# Patient Record
Sex: MALE | Race: WHITE
[De-identification: names, ages, dates, MRNs, and addresses within clinical notes are randomized per-mention and may not be internally consistent; named-entity substitution may affect disease eponyms.]

---

## 2018-09-14 ENCOUNTER — HOSPITAL ENCOUNTER (EMERGENCY)
Dept: HOSPITAL 14 - H.ER | Age: 33
Discharge: HOME | End: 2018-09-14
Payer: COMMERCIAL

## 2018-09-14 VITALS — TEMPERATURE: 98.2 F | OXYGEN SATURATION: 99 %

## 2018-09-14 VITALS — BODY MASS INDEX: 33.6 KG/M2

## 2018-09-14 VITALS — HEART RATE: 90 BPM | DIASTOLIC BLOOD PRESSURE: 92 MMHG | RESPIRATION RATE: 18 BRPM | SYSTOLIC BLOOD PRESSURE: 150 MMHG

## 2018-09-14 DIAGNOSIS — L02.211: Primary | ICD-10-CM

## 2018-09-14 NOTE — ED PDOC
HPI: Wound Care





- HPI


Time Seen by Provider: 09/14/18 11:37


Chief Complaint (Nursing): Wound Check


Chief Complaint (Provider): Wound Check


History Per: Patient


Exam Limitations: no limitations


Location Of Injury: Left: Abdomen (abdominal wall)


Additional Complaint(s): 


33 years old male with no significant medical history presents to the ED for 

evaluation of infection to left lower abdominal wall that started initially as 

an ingrown hair. Patient reports redness, swelling and pain developed and he 

went to urgent care on Monday, where he was started on antibiotics. He states a 

blister formed later on top of redness and drained fluid every day. Patient 

reports fluid drainage was small and he is here because he is not sure if 

symptoms are improving. He denies any chills, fever, nausea, vomiting and 

abdominal pain.





PMD: non provided





Past Medical History


Reviewed: Historical Data, Nursing Documentation, Vital Signs


Vital Signs: 





 Last Vital Signs











Temp  98.2 F   09/14/18 11:24


 


Pulse  93 H  09/14/18 11:24


 


Resp  22   09/14/18 11:24


 


BP  158/99 H  09/14/18 11:24


 


Pulse Ox  99   09/14/18 11:24














- Medical History


PMH: No Chronic Diseases





- Surgical History


Surgical History: No Surg Hx





- Family History


Family History: States: Unknown Family Hx





- Social History


Current smoker - smoking cessation education provided: No


Alcohol: None


Drugs: Denies





- Allergies


Allergies/Adverse Reactions: 


 Allergies











Allergy/AdvReac Type Severity Reaction Status Date / Time


 


No Known Allergies Allergy   Unverified 08/14/13 09:10














Review of Systems


ROS Statement: Except As Marked, All Systems Reviewed And Found Negative


Constitutional: Negative for: Fever, Chills


Gastrointestinal: Positive for: Other (Left lower abdominal wall infection).  

Negative for: Nausea, Vomiting, Abdominal Pain





Physical Exam





- Reviewed


Nursing Documentation Reviewed: Yes


Vital Signs Reviewed: Yes





- Physical Exam


Appears: Positive for: Non-toxic, No Acute Distress


Head Exam: Positive for: ATRAUMATIC, NORMOCEPHALIC


Gastrointestinal/Abdominal: Positive for: Other (Area of induration with mild 

edema near the center. Loss of skin with small opening and little fluctance 

near induration. No discharge. )


Neurologic/Psych: Positive for: Alert, Oriented (x3)





- ECG


O2 Sat by Pulse Oximetry: 99 (RA)


Pulse Ox Interpretation: Normal





Procedure: Wound Repair





- Time Performed


Time Performed: 12:15





- Procedure


Procedure: Wound Repair: I&D





- Anesthetic Technique


Local/Regional Anesthetic:: Lidocaine 1% w/epi





Medical Decision Making


Medical Decision Making: 


Time: 1147





Initial Impression: Cellulitits, possible abscess





Initial Plan:


--Lidocaine/Epi 1% 10 ml IJ


--I & D





1230


Patient was numbed with Lidocaine/Epi 1%. Bloody fluid and purulent drained 

after abscess I&D. Wound was packed with 1 inch stripes. Patient is instructed 

to follow up on Sunday for wound check. Patient is feeling better, is medically 

stable, and requires no further treatment in the ED at this time.


--------------------------------------------------------------------------------

-----


Scribe Attestation:


Documented by Lizbeth Rutledge, acting as a scribe for Estelita Choe MD.





Provider Scribe Attestation:


All medical record entries made by the Scribe were at my direction and 

personally dictated by me. I have reviewed the chart and agree that the record 

accurately reflects my personal performance of the history, physical exam, 

medical decision making, and the department course for this patient. I have 

also personally directed, reviewed, and agree with the discharge instructions 

and disposition.





Disposition





- Clinical Impression


Clinical Impression: 


 Abscess, abdomen








- Patient ED Disposition


Is Patient to be Admitted: No


Doctor Will See Patient In The: Office


Counseled Patient/Family Regarding: Diagnosis, Need For Followup





- Disposition


Disposition: Routine/Home


Disposition Time: 12:30


Condition: STABLE


Additional Instructions: 


Continue with Clindamycin that is prescribed by urgent care.  Followup in 

office or ER in 2-3 days.


Instructions:  Abscess Incision and Drainage


Forms:  [facility] [employer] [facility] (English)





- POA


Present On Arrival: None

## 2018-09-16 ENCOUNTER — HOSPITAL ENCOUNTER (EMERGENCY)
Dept: HOSPITAL 14 - H.ER | Age: 33
Discharge: HOME | End: 2018-09-16
Payer: COMMERCIAL

## 2018-09-16 VITALS — SYSTOLIC BLOOD PRESSURE: 155 MMHG | HEART RATE: 85 BPM | DIASTOLIC BLOOD PRESSURE: 98 MMHG | RESPIRATION RATE: 18 BRPM

## 2018-09-16 VITALS — TEMPERATURE: 98.2 F

## 2018-09-16 VITALS — BODY MASS INDEX: 33.6 KG/M2

## 2018-09-16 DIAGNOSIS — Z48.00: Primary | ICD-10-CM

## 2018-09-16 NOTE — ED PDOC
HPI: Wound Care





- HPI


Time Seen by Provider: 09/16/18 11:25


Chief Complaint (Nursing): Wound Check


Chief Complaint (Provider): Wound Check


History Per: Patient


Exam Limitations: no limitations


Location Of Injury: Left: Abdomen


Additional Complaint(s): 


33 years old male presents to the ED for wound check. Patient reports he had I&

D to left abdominal wall on 9/14/18. He offers no medical complaints.





PMD: non provided





Past Medical History


Reviewed: Historical Data, Nursing Documentation, Vital Signs


Vital Signs: 





 Last Vital Signs











Temp  98.2 F   09/16/18 11:22


 


Pulse  86   09/16/18 11:22


 


Resp  17   09/16/18 11:22


 


BP  147/101 H  09/16/18 11:22


 


Pulse Ox  97   09/16/18 11:22














- Medical History


PMH: No Chronic Diseases





- Surgical History


Surgical History: No Surg Hx





- Family History


Family History: States: Unknown Family Hx





- Social History


Current smoker - smoking cessation education provided: No


Alcohol: Social


Drugs: Denies





- Allergies


Allergies/Adverse Reactions: 


 Allergies











Allergy/AdvReac Type Severity Reaction Status Date / Time


 


No Known Allergies Allergy   Verified 09/16/18 11:59














Review of Systems


ROS Statement: Except As Marked, All Systems Reviewed And Found Negative


Constitutional: Negative for: Fever


Gastrointestinal: Negative for: Abdominal Pain





Physical Exam





- Reviewed


Nursing Documentation Reviewed: Yes


Vital Signs Reviewed: Yes





- Physical Exam


Appears: Positive for: Non-toxic, No Acute Distress


Head Exam: Positive for: ATRAUMATIC, NORMOCEPHALIC


Skin: Positive for: Normal Color, Warm, Dry


Gastrointestinal/Abdominal: Positive for: Normal Exam, Soft, Other (Left lower 

abdominal wall incision with packing in place, wound is dry. No crepitus, 

drainage or discharge.)


Neurologic/Psych: Positive for: Alert, Oriented (x3)





- ECG


O2 Sat by Pulse Oximetry: 97 (RA)


Pulse Ox Interpretation: Normal





Medical Decision Making


Medical Decision Making: 


Time: 1200





Changed packing. Patient tolerated procedure well and stable for discharge.





--------------------------------------------------------------------------------

-----


Scribe Attestation:


Documented by Lizbeth Rutledge, acting as a scribe for Lia Wharton MD.





Provider Scribe Attestation:


All medical record entries made by the Scribe were at my direction and 

personally dictated by me. I have reviewed the chart and agree that the record 

accurately reflects my personal performance of the history, physical exam, 

medical decision making, and the department course for this patient. I have 

also personally directed, reviewed, and agree with the discharge instructions 

and disposition.





Disposition





- Clinical Impression


Clinical Impression: 


 Change or removal of wound packing








- Disposition


Disposition: Routine/Home


Disposition Time: 12:02


Condition: GOOD


Additional Instructions: 


RETURN TO ED IN 2-3 DAYS FOR PACKING CHANGE.


Instructions:  Abscess Incision and Drainage (DC)

## 2018-09-18 VITALS — OXYGEN SATURATION: 97 %

## 2018-09-19 ENCOUNTER — HOSPITAL ENCOUNTER (EMERGENCY)
Dept: HOSPITAL 14 - H.ER | Age: 33
Discharge: HOME | End: 2018-09-19
Payer: COMMERCIAL

## 2018-09-19 VITALS — BODY MASS INDEX: 33.6 KG/M2

## 2018-09-19 VITALS
SYSTOLIC BLOOD PRESSURE: 148 MMHG | HEART RATE: 84 BPM | RESPIRATION RATE: 18 BRPM | DIASTOLIC BLOOD PRESSURE: 104 MMHG | TEMPERATURE: 98.3 F | OXYGEN SATURATION: 99 %

## 2018-09-19 DIAGNOSIS — Z48.00: Primary | ICD-10-CM

## 2018-09-19 NOTE — ED PDOC
HPI: Wound Care





- HPI


Time Seen by Provider: 09/19/18 17:33


Chief Complaint (Nursing): Wound Check


Chief Complaint (Provider): Wound check


History Per: Patient


Exam Limitations: no limitations


Onset/Duration Of Symptoms: Days (x1 week)


Current Symptoms Are (Timing): Still Present


Location Of Injury: Posterior: Abdomen


Additional Complaint(s): 





Jus Enrique is a 33 year old male, with no significant past medical 

history, who presents to the emergency department for wound check. Patient 

states over x1 week ago he developed an abscess, he was seen at an urgent care 

and was prescribed a 10-day course of Clindamycin which he already finished. 

Patient was seen 09/14 here, he had an I&D and the wound packed. Patient was 

seen again on 09/16 for wound repacking and was advised to come today for 

follow up. He denies any fever, chills or other medical complaints.





PMD: None provided. 





Past Medical History


Reviewed: Historical Data, Nursing Documentation, Vital Signs


Vital Signs: 





 Last Vital Signs











Temp  98.3 F   09/19/18 17:08


 


Pulse  84   09/19/18 17:08


 


Resp  18   09/19/18 17:08


 


BP  148/104 H  09/19/18 17:08


 


Pulse Ox  99   09/19/18 17:08














- Medical History


PMH: No Chronic Diseases





- Surgical History


Surgical History: No Surg Hx





- Family History


Family History: States: Unknown Family Hx





- Allergies


Allergies/Adverse Reactions: 


 Allergies











Allergy/AdvReac Type Severity Reaction Status Date / Time


 


No Known Allergies Allergy   Verified 09/19/18 17:11














Review of Systems


ROS Statement: Except As Marked, All Systems Reviewed And Found Negative


Constitutional: Positive for: Other (wound check)





Physical Exam





- Reviewed


Nursing Documentation Reviewed: Yes


Vital Signs Reviewed: Yes





- Physical Exam


Appears: Positive for: No Acute Distress


Head Exam: Positive for: ATRAUMATIC, NORMOCEPHALIC


Skin: Positive for: Normal Color, Warm, Dry


Eye Exam: Positive for: Normal appearance


Neck: Positive for: Painless ROM


Gastrointestinal/Abdominal: Positive for: Other (well healing wound to left 

lower abdomen with minimal surrounding induration but no active drainage or 

fluctuance)


Extremity: Positive for: Normal ROM (upper and lower extremities).  Negative for

: Deformity


Neurologic/Psych: Positive for: Alert, Oriented





- ECG


O2 Sat by Pulse Oximetry: 99 (RA)


Pulse Ox Interpretation: Normal





Medical Decision Making


Medical Decision Making: 





Time: 17:33


Initial Impression: Wound check





Initial Plan:





--The site was dressed with clean, dry, sterile dressing. 








--------------------------------------------------------------------------------

-----------------   


Scribe Attestation:


Documented by Regino Hanna, acting as a scribe for Nanette Jha PA-C





Provider Scribe Attestation:


All medical record entries made by the Scribe were at my direction and 

personally dictated by me. I have reviewed the chart and agree that the record 

accurately reflects my personal performance of the history, physical exam, 

medical decision making, and the department course for this patient. I have 

also personally directed, reviewed, and agree with the discharge instructions 

and disposition.





Disposition





- Clinical Impression


Clinical Impression: 


 Encounter for wound re-check, Change or removal of wound packing








- Patient ED Disposition


Is Patient to be Admitted: No


Doctor Will See Patient In The: Office


Counseled Patient/Family Regarding: Need For Followup





- Disposition


Disposition: Routine/Home


Disposition Time: 18:50


Condition: IMPROVED


Instructions:  Surgical Wound (DC)


Forms:  [facility] (English)